# Patient Record
Sex: FEMALE | Race: WHITE | NOT HISPANIC OR LATINO | Employment: PART TIME | URBAN - NONMETROPOLITAN AREA
[De-identification: names, ages, dates, MRNs, and addresses within clinical notes are randomized per-mention and may not be internally consistent; named-entity substitution may affect disease eponyms.]

---

## 2017-05-07 ENCOUNTER — HOSPITAL ENCOUNTER (EMERGENCY)
Facility: HOSPITAL | Age: 27
Discharge: HOME | End: 2017-05-07
Attending: FAMILY MEDICINE | Admitting: FAMILY MEDICINE

## 2017-05-07 ENCOUNTER — APPOINTMENT (OUTPATIENT)
Dept: CT IMAGING | Facility: HOSPITAL | Age: 27
End: 2017-05-07

## 2017-05-07 VITALS
HEIGHT: 66 IN | SYSTOLIC BLOOD PRESSURE: 137 MMHG | DIASTOLIC BLOOD PRESSURE: 74 MMHG | WEIGHT: 226 LBS | HEART RATE: 78 BPM | OXYGEN SATURATION: 99 % | RESPIRATION RATE: 18 BRPM | BODY MASS INDEX: 36.32 KG/M2 | TEMPERATURE: 98 F

## 2017-05-07 DIAGNOSIS — G44.209 TENSION HEADACHE: Primary | ICD-10-CM

## 2017-05-07 LAB
ALBUMIN SERPL-MCNC: 4.2 G/DL (ref 3.5–5)
ALBUMIN/GLOB SERPL: 1.3 G/DL (ref 1.1–2.5)
ALP SERPL-CCNC: 120 U/L (ref 24–120)
ALT SERPL W P-5'-P-CCNC: 43 U/L (ref 0–54)
AMYLASE SERPL-CCNC: 80 U/L (ref 30–110)
ANION GAP SERPL CALCULATED.3IONS-SCNC: 14 MMOL/L (ref 4–13)
AST SERPL-CCNC: 34 U/L (ref 7–45)
BASOPHILS # BLD AUTO: 0.04 10*3/MM3 (ref 0–0.2)
BASOPHILS NFR BLD AUTO: 0.3 % (ref 0–2)
BILIRUB SERPL-MCNC: 0.4 MG/DL (ref 0.1–1)
BUN BLD-MCNC: 15 MG/DL (ref 5–21)
BUN/CREAT SERPL: 17.6 (ref 7–25)
CALCIUM SPEC-SCNC: 9.1 MG/DL (ref 8.4–10.4)
CHLORIDE SERPL-SCNC: 105 MMOL/L (ref 98–110)
CO2 SERPL-SCNC: 21 MMOL/L (ref 24–31)
CREAT BLD-MCNC: 0.85 MG/DL (ref 0.5–1.4)
CRP SERPL-MCNC: 0.56 MG/DL (ref 0–0.99)
DEPRECATED RDW RBC AUTO: 44 FL (ref 40–54)
EOSINOPHIL # BLD AUTO: 0.24 10*3/MM3 (ref 0–0.7)
EOSINOPHIL NFR BLD AUTO: 2 % (ref 0–4)
ERYTHROCYTE [DISTWIDTH] IN BLOOD BY AUTOMATED COUNT: 14.3 % (ref 12–15)
ERYTHROCYTE [SEDIMENTATION RATE] IN BLOOD: 16 MM/HR (ref 0–20)
GFR SERPL CREATININE-BSD FRML MDRD: 80 ML/MIN/1.73
GLOBULIN UR ELPH-MCNC: 3.3 GM/DL
GLUCOSE BLD-MCNC: 98 MG/DL (ref 70–100)
HCT VFR BLD AUTO: 40.3 % (ref 37–47)
HGB BLD-MCNC: 14.2 G/DL (ref 12–16)
IMM GRANULOCYTES # BLD: 0.02 10*3/MM3 (ref 0–0.03)
IMM GRANULOCYTES NFR BLD: 0.2 % (ref 0–5)
LIPASE SERPL-CCNC: 64 U/L (ref 23–203)
LYMPHOCYTES # BLD AUTO: 2.85 10*3/MM3 (ref 0.72–4.86)
LYMPHOCYTES NFR BLD AUTO: 23.3 % (ref 15–45)
MCH RBC QN AUTO: 29.6 PG (ref 28–32)
MCHC RBC AUTO-ENTMCNC: 35.2 G/DL (ref 33–36)
MCV RBC AUTO: 84.1 FL (ref 82–98)
MONOCYTES # BLD AUTO: 0.61 10*3/MM3 (ref 0.19–1.3)
MONOCYTES NFR BLD AUTO: 5 % (ref 4–12)
NEUTROPHILS # BLD AUTO: 8.47 10*3/MM3 (ref 1.87–8.4)
NEUTROPHILS NFR BLD AUTO: 69.2 % (ref 39–78)
PLATELET # BLD AUTO: 378 10*3/MM3 (ref 130–400)
PMV BLD AUTO: 10.2 FL (ref 6–12)
POTASSIUM BLD-SCNC: 4 MMOL/L (ref 3.5–5.3)
PROT SERPL-MCNC: 7.5 G/DL (ref 6.3–8.7)
RBC # BLD AUTO: 4.79 10*6/MM3 (ref 4.2–5.4)
SODIUM BLD-SCNC: 140 MMOL/L (ref 135–145)
TSH SERPL DL<=0.05 MIU/L-ACNC: 1.88 MIU/ML (ref 0.47–4.68)
WBC NRBC COR # BLD: 12.23 10*3/MM3 (ref 4.8–10.8)

## 2017-05-07 PROCEDURE — 80053 COMPREHEN METABOLIC PANEL: CPT | Performed by: FAMILY MEDICINE

## 2017-05-07 PROCEDURE — 99283 EMERGENCY DEPT VISIT LOW MDM: CPT

## 2017-05-07 PROCEDURE — 82150 ASSAY OF AMYLASE: CPT | Performed by: FAMILY MEDICINE

## 2017-05-07 PROCEDURE — 70450 CT HEAD/BRAIN W/O DYE: CPT

## 2017-05-07 PROCEDURE — 96360 HYDRATION IV INFUSION INIT: CPT

## 2017-05-07 PROCEDURE — 84443 ASSAY THYROID STIM HORMONE: CPT | Performed by: FAMILY MEDICINE

## 2017-05-07 PROCEDURE — 86140 C-REACTIVE PROTEIN: CPT | Performed by: FAMILY MEDICINE

## 2017-05-07 PROCEDURE — 85025 COMPLETE CBC W/AUTO DIFF WBC: CPT | Performed by: FAMILY MEDICINE

## 2017-05-07 PROCEDURE — 83690 ASSAY OF LIPASE: CPT | Performed by: FAMILY MEDICINE

## 2017-05-07 PROCEDURE — 85651 RBC SED RATE NONAUTOMATED: CPT | Performed by: FAMILY MEDICINE

## 2017-05-07 RX ORDER — HYDROCODONE BITARTRATE AND ACETAMINOPHEN 5; 325 MG/1; MG/1
1 TABLET ORAL ONCE
Status: COMPLETED | OUTPATIENT
Start: 2017-05-07 | End: 2017-05-07

## 2017-05-07 RX ORDER — PROMETHAZINE HYDROCHLORIDE 25 MG/1
12.5 TABLET ORAL ONCE
Status: DISCONTINUED | OUTPATIENT
Start: 2017-05-07 | End: 2017-05-07

## 2017-05-07 RX ORDER — BUTALBITAL, ACETAMINOPHEN AND CAFFEINE 50; 325; 40 MG/1; MG/1; MG/1
1 TABLET ORAL EVERY 6 HOURS PRN
Qty: 10 TABLET | Refills: 0 | Status: SHIPPED | OUTPATIENT
Start: 2017-05-07 | End: 2019-12-12

## 2017-05-07 RX ORDER — ONDANSETRON 4 MG/1
4 TABLET, ORALLY DISINTEGRATING ORAL 4 TIMES DAILY PRN
Qty: 20 TABLET | Refills: 0 | Status: SHIPPED | OUTPATIENT
Start: 2017-05-07 | End: 2018-06-14

## 2017-05-07 RX ORDER — LAMOTRIGINE 100 MG/1
100 TABLET ORAL DAILY
COMMUNITY

## 2017-05-07 RX ORDER — SODIUM CHLORIDE 9 MG/ML
125 INJECTION, SOLUTION INTRAVENOUS CONTINUOUS
Status: DISCONTINUED | OUTPATIENT
Start: 2017-05-07 | End: 2017-05-08 | Stop reason: HOSPADM

## 2017-05-07 RX ORDER — LURASIDONE HYDROCHLORIDE 40 MG/1
40 TABLET, FILM COATED ORAL DAILY
COMMUNITY

## 2017-05-07 RX ORDER — DIAZEPAM 5 MG/1
5 TABLET ORAL EVERY 12 HOURS PRN
COMMUNITY

## 2017-05-07 RX ADMIN — HYDROCODONE BITARTRATE AND ACETAMINOPHEN 1 TABLET: 5; 325 TABLET ORAL at 23:14

## 2017-05-07 RX ADMIN — SODIUM CHLORIDE 1000 ML: 9 INJECTION, SOLUTION INTRAVENOUS at 22:03

## 2017-05-08 LAB
HOLD SPECIMEN: NORMAL
HOLD SPECIMEN: NORMAL
WHOLE BLOOD HOLD SPECIMEN: NORMAL
WHOLE BLOOD HOLD SPECIMEN: NORMAL

## 2017-07-12 ENCOUNTER — HOSPITAL ENCOUNTER (EMERGENCY)
Facility: HOSPITAL | Age: 27
Discharge: HOME | End: 2017-07-12
Admitting: EMERGENCY MEDICINE

## 2017-07-12 ENCOUNTER — APPOINTMENT (OUTPATIENT)
Dept: GENERAL RADIOLOGY | Facility: HOSPITAL | Age: 27
End: 2017-07-12

## 2017-07-12 VITALS
HEART RATE: 86 BPM | RESPIRATION RATE: 16 BRPM | OXYGEN SATURATION: 100 % | SYSTOLIC BLOOD PRESSURE: 135 MMHG | BODY MASS INDEX: 38.03 KG/M2 | DIASTOLIC BLOOD PRESSURE: 85 MMHG | HEIGHT: 65 IN | TEMPERATURE: 98.1 F | WEIGHT: 228.25 LBS

## 2017-07-12 DIAGNOSIS — S60.221A CONTUSION OF RIGHT HAND, INITIAL ENCOUNTER: Primary | ICD-10-CM

## 2017-07-12 PROCEDURE — 99283 EMERGENCY DEPT VISIT LOW MDM: CPT

## 2017-07-12 PROCEDURE — 73130 X-RAY EXAM OF HAND: CPT

## 2017-07-12 RX ORDER — IBUPROFEN 800 MG/1
800 TABLET ORAL EVERY 6 HOURS PRN
Qty: 21 TABLET | Refills: 0 | Status: SHIPPED | OUTPATIENT
Start: 2017-07-12 | End: 2022-12-28 | Stop reason: SDUPTHER

## 2017-07-12 RX ORDER — IBUPROFEN 800 MG/1
800 TABLET ORAL ONCE
Status: COMPLETED | OUTPATIENT
Start: 2017-07-12 | End: 2017-07-12

## 2017-07-12 RX ADMIN — IBUPROFEN 800 MG: 800 TABLET, FILM COATED ORAL at 18:02

## 2017-07-12 NOTE — ED PROVIDER NOTES
Subjective   History of Present Illness  19-year-old female presents with chief complaint of right hand pain.  She reports past few weeks she has numbness right hand pain that is worse with palpation and with usage.  It is on the dorsum side of her hand.  Patient believes she may have injured it in multiple ways and is unsure of how she may have truthfully injured.  Review of Systems   All other systems reviewed and are negative.      Past Medical History:   Diagnosis Date   • Anxiety    • Bipolar 1 disorder    • Depression        No Known Allergies    Past Surgical History:   Procedure Laterality Date   • TUBAL ABDOMINAL LIGATION         History reviewed. No pertinent family history.    Social History     Social History   • Marital status: Single     Spouse name: N/A   • Number of children: N/A   • Years of education: N/A     Social History Main Topics   • Smoking status: Current Every Day Smoker     Packs/day: 0.50     Types: Cigarettes   • Smokeless tobacco: None   • Alcohol use No   • Drug use: No   • Sexual activity: Yes     Partners: Male     Other Topics Concern   • None     Social History Narrative   • None           Objective   Physical Exam   Constitutional: She is oriented to person, place, and time. She appears well-developed and well-nourished.   HENT:   Head: Normocephalic and atraumatic.   Eyes: EOM are normal. Pupils are equal, round, and reactive to light.   Neck: Normal range of motion. Neck supple.   Cardiovascular: Normal rate and regular rhythm.    Pulmonary/Chest: Effort normal and breath sounds normal.   Abdominal: Soft.   Musculoskeletal: Normal range of motion.   Patient is able to flex and extend all digits of her hand.  There is no evidence of deformity or swelling.   Neurological: She is alert and oriented to person, place, and time.   Skin: Skin is warm.   Nursing note and vitals reviewed.      Procedures         ED Course  ED Course                  MDM  Number of Diagnoses or  Management Options  Diagnosis management comments: Unremarkable hand film.  Probable contusion.  She can take nonsteroidal anti-inflammatory drugs assist with the pain and inflammation next    Hand xray interpreted by me       Amount and/or Complexity of Data Reviewed  Tests in the radiology section of CPT®: ordered and reviewed    Risk of Complications, Morbidity, and/or Mortality  Presenting problems: low  Diagnostic procedures: low  Management options: low    Patient Progress  Patient progress: stable      Final diagnoses:   Contusion of right hand, initial encounter            Kendrick Khoury PA-C  07/13/17 1134

## 2018-03-12 ENCOUNTER — HOSPITAL ENCOUNTER (EMERGENCY)
Facility: HOSPITAL | Age: 28
Discharge: HOME | End: 2018-03-12
Attending: FAMILY MEDICINE | Admitting: FAMILY MEDICINE

## 2018-03-12 ENCOUNTER — APPOINTMENT (OUTPATIENT)
Dept: CT IMAGING | Facility: HOSPITAL | Age: 28
End: 2018-03-12

## 2018-03-12 VITALS
OXYGEN SATURATION: 98 % | TEMPERATURE: 97.7 F | HEIGHT: 65 IN | BODY MASS INDEX: 36.65 KG/M2 | SYSTOLIC BLOOD PRESSURE: 110 MMHG | DIASTOLIC BLOOD PRESSURE: 70 MMHG | RESPIRATION RATE: 16 BRPM | HEART RATE: 76 BPM | WEIGHT: 220 LBS

## 2018-03-12 DIAGNOSIS — R10.84 GENERALIZED ABDOMINAL PAIN: Primary | ICD-10-CM

## 2018-03-12 DIAGNOSIS — N39.0 ACUTE LOWER UTI: ICD-10-CM

## 2018-03-12 LAB
ALBUMIN SERPL-MCNC: 4.6 G/DL (ref 3.5–5)
ALBUMIN/GLOB SERPL: 1 G/DL (ref 1.1–2.5)
ALP SERPL-CCNC: 135 U/L (ref 24–120)
ALT SERPL W P-5'-P-CCNC: 59 U/L (ref 0–54)
ANION GAP SERPL CALCULATED.3IONS-SCNC: 15 MMOL/L (ref 4–13)
AST SERPL-CCNC: 58 U/L (ref 7–45)
B-HCG UR QL: NEGATIVE
BACTERIA UR QL AUTO: ABNORMAL /HPF
BASOPHILS # BLD AUTO: 0.06 10*3/MM3 (ref 0–0.2)
BASOPHILS NFR BLD AUTO: 0.5 % (ref 0–2)
BILIRUB SERPL-MCNC: 0.7 MG/DL (ref 0.1–1)
BILIRUB UR QL STRIP: ABNORMAL
BUN BLD-MCNC: 16 MG/DL (ref 5–21)
BUN/CREAT SERPL: 24.2 (ref 7–25)
CALCIUM SPEC-SCNC: 9.6 MG/DL (ref 8.4–10.4)
CHLORIDE SERPL-SCNC: 102 MMOL/L (ref 98–110)
CLARITY UR: ABNORMAL
CO2 SERPL-SCNC: 24 MMOL/L (ref 24–31)
COLOR UR: ABNORMAL
CREAT BLD-MCNC: 0.66 MG/DL (ref 0.5–1.4)
DEPRECATED RDW RBC AUTO: 43.2 FL (ref 40–54)
EOSINOPHIL # BLD AUTO: 0.52 10*3/MM3 (ref 0–0.7)
EOSINOPHIL NFR BLD AUTO: 4 % (ref 0–4)
ERYTHROCYTE [DISTWIDTH] IN BLOOD BY AUTOMATED COUNT: 13.9 % (ref 12–15)
GFR SERPL CREATININE-BSD FRML MDRD: 107 ML/MIN/1.73
GLOBULIN UR ELPH-MCNC: 4.5 GM/DL
GLUCOSE BLD-MCNC: 152 MG/DL (ref 70–100)
GLUCOSE UR STRIP-MCNC: NEGATIVE MG/DL
HCT VFR BLD AUTO: 41.4 % (ref 37–47)
HGB BLD-MCNC: 13.9 G/DL (ref 12–16)
HGB UR QL STRIP.AUTO: NEGATIVE
HYALINE CASTS UR QL AUTO: ABNORMAL /LPF
IMM GRANULOCYTES # BLD: 0.08 10*3/MM3 (ref 0–0.03)
IMM GRANULOCYTES NFR BLD: 0.6 % (ref 0–5)
INTERNAL NEGATIVE CONTROL: NEGATIVE
INTERNAL POSITIVE CONTROL: POSITIVE
KETONES UR QL STRIP: ABNORMAL
LEUKOCYTE ESTERASE UR QL STRIP.AUTO: ABNORMAL
LIPASE SERPL-CCNC: 55 U/L (ref 23–203)
LYMPHOCYTES # BLD AUTO: 2.42 10*3/MM3 (ref 0.72–4.86)
LYMPHOCYTES NFR BLD AUTO: 18.6 % (ref 15–45)
Lab: NORMAL
MCH RBC QN AUTO: 29 PG (ref 28–32)
MCHC RBC AUTO-ENTMCNC: 33.6 G/DL (ref 33–36)
MCV RBC AUTO: 86.4 FL (ref 82–98)
MONOCYTES # BLD AUTO: 0.62 10*3/MM3 (ref 0.19–1.3)
MONOCYTES NFR BLD AUTO: 4.8 % (ref 4–12)
NEUTROPHILS # BLD AUTO: 9.29 10*3/MM3 (ref 1.87–8.4)
NEUTROPHILS NFR BLD AUTO: 71.5 % (ref 39–78)
NITRITE UR QL STRIP: NEGATIVE
NRBC BLD MANUAL-RTO: 0 /100 WBC (ref 0–0)
PH UR STRIP.AUTO: 6 [PH] (ref 5–8)
PLATELET # BLD AUTO: 426 10*3/MM3 (ref 130–400)
PMV BLD AUTO: 9.6 FL (ref 6–12)
POTASSIUM BLD-SCNC: 4.5 MMOL/L (ref 3.5–5.3)
PROT SERPL-MCNC: 9.1 G/DL (ref 6.3–8.7)
PROT UR QL STRIP: ABNORMAL
RBC # BLD AUTO: 4.79 10*6/MM3 (ref 4.2–5.4)
RBC # UR: ABNORMAL /HPF
REF LAB TEST METHOD: ABNORMAL
SODIUM BLD-SCNC: 141 MMOL/L (ref 135–145)
SP GR UR STRIP: >1.03 (ref 1–1.03)
SQUAMOUS #/AREA URNS HPF: ABNORMAL /HPF
UROBILINOGEN UR QL STRIP: ABNORMAL
WBC NRBC COR # BLD: 12.99 10*3/MM3 (ref 4.8–10.8)
WBC UR QL AUTO: ABNORMAL /HPF

## 2018-03-12 PROCEDURE — 81001 URINALYSIS AUTO W/SCOPE: CPT | Performed by: FAMILY MEDICINE

## 2018-03-12 PROCEDURE — 74176 CT ABD & PELVIS W/O CONTRAST: CPT

## 2018-03-12 PROCEDURE — 25010000002 KETOROLAC TROMETHAMINE PER 15 MG: Performed by: FAMILY MEDICINE

## 2018-03-12 PROCEDURE — 99284 EMERGENCY DEPT VISIT MOD MDM: CPT

## 2018-03-12 PROCEDURE — 80053 COMPREHEN METABOLIC PANEL: CPT | Performed by: FAMILY MEDICINE

## 2018-03-12 PROCEDURE — 25010000002 ONDANSETRON PER 1 MG: Performed by: FAMILY MEDICINE

## 2018-03-12 PROCEDURE — 85025 COMPLETE CBC W/AUTO DIFF WBC: CPT | Performed by: FAMILY MEDICINE

## 2018-03-12 PROCEDURE — 83690 ASSAY OF LIPASE: CPT | Performed by: FAMILY MEDICINE

## 2018-03-12 PROCEDURE — 96375 TX/PRO/DX INJ NEW DRUG ADDON: CPT

## 2018-03-12 PROCEDURE — 96374 THER/PROPH/DIAG INJ IV PUSH: CPT

## 2018-03-12 PROCEDURE — 96361 HYDRATE IV INFUSION ADD-ON: CPT

## 2018-03-12 RX ORDER — ONDANSETRON 4 MG/1
4 TABLET, ORALLY DISINTEGRATING ORAL EVERY 4 HOURS PRN
Qty: 10 TABLET | Refills: 0 | Status: SHIPPED | OUTPATIENT
Start: 2018-03-12 | End: 2018-06-14

## 2018-03-12 RX ORDER — KETOROLAC TROMETHAMINE 30 MG/ML
30 INJECTION, SOLUTION INTRAMUSCULAR; INTRAVENOUS ONCE
Status: COMPLETED | OUTPATIENT
Start: 2018-03-12 | End: 2018-03-12

## 2018-03-12 RX ORDER — CIPROFLOXACIN 500 MG/1
500 TABLET, FILM COATED ORAL 2 TIMES DAILY
Qty: 20 TABLET | Refills: 0 | Status: SHIPPED | OUTPATIENT
Start: 2018-03-12 | End: 2018-06-14

## 2018-03-12 RX ORDER — SODIUM CHLORIDE, SODIUM LACTATE, POTASSIUM CHLORIDE, CALCIUM CHLORIDE 600; 310; 30; 20 MG/100ML; MG/100ML; MG/100ML; MG/100ML
125 INJECTION, SOLUTION INTRAVENOUS CONTINUOUS
Status: DISCONTINUED | OUTPATIENT
Start: 2018-03-12 | End: 2018-03-12 | Stop reason: HOSPADM

## 2018-03-12 RX ORDER — ONDANSETRON 2 MG/ML
4 INJECTION INTRAMUSCULAR; INTRAVENOUS ONCE
Status: COMPLETED | OUTPATIENT
Start: 2018-03-12 | End: 2018-03-12

## 2018-03-12 RX ORDER — HYDROCODONE BITARTRATE AND ACETAMINOPHEN 7.5; 325 MG/1; MG/1
1 TABLET ORAL EVERY 4 HOURS PRN
Qty: 15 TABLET | Refills: 0 | Status: SHIPPED | OUTPATIENT
Start: 2018-03-12 | End: 2018-06-14

## 2018-03-12 RX ADMIN — KETOROLAC TROMETHAMINE 30 MG: 30 INJECTION, SOLUTION INTRAMUSCULAR at 06:06

## 2018-03-12 RX ADMIN — ONDANSETRON 4 MG: 2 INJECTION INTRAMUSCULAR; INTRAVENOUS at 06:06

## 2018-03-12 RX ADMIN — SODIUM CHLORIDE, POTASSIUM CHLORIDE, SODIUM LACTATE AND CALCIUM CHLORIDE 125 ML/HR: 600; 310; 30; 20 INJECTION, SOLUTION INTRAVENOUS at 06:05

## 2018-03-12 NOTE — ED PROVIDER NOTES
[facility]  eMERGENCY dEPARTMENT eNCOUnter      Pt Name: Elvi Haley  MRN: [id number]  Birthdate 1990  Date of evaluation: 3/12/2018      CHIEF COMPLAINT       Chief Complaint   Patient presents with   • Abdominal Pain   • Nausea   • Vomiting       Nurses Notes reviewed and I agree except as noted in the HPI.      HISTORY OF PRESENT ILLNESS    Elvi Haley is a 27 y.o. female who presents     Patient presents for generalized abdominal pain that started yesterday at 8:30 AM.  She describes the pain as cramping diffuse nonradiating.  Patient states that it is sharp in nature and cramping.  She has had nothing prior to arrival for treatment.  She presents here for evaluation and treatment.     REVIEW OF SYSTEMS     Review of Systems  CONSTITUION: No Fever, No chills, No activity change, No diaphoresis, No unexpected wt change.    HENT: No congestion, no dental problems, no ear pain or discharge,   EYES: No drainage, no itching, no photophobia, no visual disturbance  RESPIRATORY: No apnea, no chest tightness, no cough, no shortness of breath, no stridor, no wheezing  CARDIOVASCULAR: No chest pain, no palpitations, no leg swelling  GI: Procedures  As per the HPI otherwise negative.  ENDOCRINE: No polydipsia, no polyphagia, no polyuria, no cold or heat intolerance  : No difficulty urinating, no dyspareunia, nodysuria, no flank pain, no frequency, no genital sore, no hematuria, no menstrual problem if female, no decreased urniation, no hesitation of urination, no vaginal discharge if female, no vaginal pain if female no penile pain if male  ALLERG/IMMUNO:  No env or food allergies, not immunocompromised  NEUROLOGICAL: No dizziness, no facial asymmetry, no Headaches, no Light-headedness, no numbess nor paresthesias, no seizures, no speech difficulty, No syncope, no temors, no weakness  HEMATOLOGIC: No adenopathy, no unusual bleeding or bruising  MUSC: No arthralgia, no back pain, no joint  "swelling, no myalgias, no neck pain, no neck stiffness  SKIN: No rash, no color change, no pallor, no wound  PSYCH: No agitation, no behavior problem, no confusion, no decr concentration, no hallucinations, no suicidal ideation, no homicidal ideation, no self injury, no sleep disturbance      PAST MEDICAL HISTORY     Past Medical History:   Diagnosis Date   • Anxiety    • Bipolar 1 disorder    • Depression        SURGICAL HISTORY      has a past surgical history that includes Tubal ligation.    CURRENT MEDICATIONS        Medication List      ASK your doctor about these medications    butalbital-acetaminophen-caffeine -40 MG per tablet  Commonly known as:  FIORICET, ESGIC  Take 1 tablet by mouth Every 6 (Six) Hours As Needed for Headache for up   to 10 doses.     diazePAM 5 MG tablet  Commonly known as:  VALIUM     ibuprofen 800 MG tablet  Commonly known as:  ADVIL,MOTRIN  Take 1 tablet by mouth Every 6 (Six) Hours As Needed for Mild Pain (1-3).     lamoTRIgine 100 MG tablet  Commonly known as:  LaMICtal     lurasidone 40 MG tablet tablet  Commonly known as:  LATUDA     ondansetron ODT 4 MG disintegrating tablet  Commonly known as:  ZOFRAN-ODT  Take 1 tablet by mouth 4 (Four) Times a Day As Needed for Nausea or   Vomiting.     prenatal (CLASSIC) vitamin 28-0.8 MG tablet tablet          ALLERGIES     has No Known Allergies.    FAMILY HISTORY     has no family status information on file.    family history is not on file.    SOCIAL HISTORY      reports that she has been smoking Cigarettes.  She has been smoking about 0.50 packs per day. She does not have any smokeless tobacco history on file. She reports that she does not drink alcohol or use drugs.    PHYSICAL EXAM     INITIAL VITALS:  height is 165.1 cm (65\") and weight is 99.8 kg (220 lb). Her temperature is 98.1 °F (36.7 °C). Her blood pressure is 134/87 and her pulse is 94. Her respiration is 16 and oxygen saturation is 99%.    Physical " Exam    CONSTITUTIONAL: Well developed, well nourished, not diaphoretic nor distressed  HENT: Normocephalic, atraumatic, oropharynx clear and moist  EYES: PERRL, EOM normal, no discharge, no scleral icterus  NECK: ROM normal, supple, no tracheal deviation nor JVD, no stridor  CARDIOVASCULAR: Normal rate and rhythm, heart sounds normal, no rub no gallop, intact distal pulses, normal cap refill  PULMONARY: Normal effort and breath sounds, no distress, no wheezes, rhonci or rales, no chest tenderness  ABDOMINAL: Soft, nontender, no guarding, no mass, no rebound, no hernia  GENITOURINARY/ANORECTAL: deferred  MUSCKULOSKELETAL: ROM normal, no tenderness nor deformity, no edema  NEUROLOGICAL: Alert, oriented x 3, DTRS normal, CN x 10 normal, normal tone  SKIN: Warm, dry, no erythema, no rash, normal color  PSYCH: Mood and affect normal, behavior normal, thought content and judgement normal.    DIFFERENTIAL DIAGNOSIS:       DIAGNOSTIC RESULTS     EKG: All EKG's are interpreted by the Emergency Department Physician who either signs or Co-signs this chart in the absence of a cardiologist.      RADIOLOGY: non-plain film images(s) such as CT, Ultrasound and MRI are read by the radiologist.  Plain radiographic images are visualized and preliminarily interpreted by the emergency physician unless otherwise stated below.  No results found.           LABS:   Lab Results (last 24 hours)     Procedure Component Value Units Date/Time    CBC & Differential [[id number]] Collected:  03/12/18 0544    Specimen:  Blood Updated:  03/12/18 0556    Narrative:       The following orders were created for panel order CBC & Differential.  Procedure                               Abnormality         Status                     ---------                               -----------         ------                     CBC Auto Differential[[id number]]         Abnormal            Final result                 Please view results for these tests on the individual  "orders.    Comprehensive Metabolic Panel [[id number]] Collected:  03/12/18 0544    Specimen:  Blood Updated:  03/12/18 0548    Lipase [[id number]] Collected:  03/12/18 0544    Specimen:  Blood Updated:  03/12/18 0548    CBC Auto Differential [[id number]]  (Abnormal) Collected:  03/12/18 0544    Specimen:  Blood Updated:  03/12/18 0556     WBC 12.99 (H) 10*3/mm3      RBC 4.79 10*6/mm3      Hemoglobin 13.9 g/dL      Hematocrit 41.4 %      MCV 86.4 fL      MCH 29.0 pg      MCHC 33.6 g/dL      RDW 13.9 %      RDW-SD 43.2 fl      MPV 9.6 fL      Platelets 426 (H) 10*3/mm3      Neutrophil % 71.5 %      Lymphocyte % 18.6 %      Monocyte % 4.8 %      Eosinophil % 4.0 %      Basophil % 0.5 %      Immature Grans % 0.6 %      Neutrophils, Absolute 9.29 (H) 10*3/mm3      Lymphocytes, Absolute 2.42 10*3/mm3      Monocytes, Absolute 0.62 10*3/mm3      Eosinophils, Absolute 0.52 10*3/mm3      Basophils, Absolute 0.06 10*3/mm3      Immature Grans, Absolute 0.08 (H) 10*3/mm3      nRBC 0.0 /100 WBC           EMERGENCY DEPARTMENT COURSE:   Vitals:    Vitals:    03/12/18 0506 03/12/18 0513 03/12/18 0516   BP: 141/96  134/87   Pulse: 94 94    Resp: 16     Temp: 98.1 °F (36.7 °C)     SpO2: 100% 99%    Weight: 99.8 kg (220 lb)     Height: 165.1 cm (65\")         The patient was given the following medications:  Medications   ondansetron (ZOFRAN) injection 4 mg (not administered)   ketorolac (TORADOL) injection 30 mg (not administered)   lactated ringers infusion (not administered)       ED Course       CRITICAL CARE:  none    CONSULTS:  none    PROCEDURES:  None    FINAL IMPRESSION      1. Generalized abdominal pain          DISPOSITION/PLAN   Patient will be transferred to the care of Dr. Lisa at shift change she is currently stable.      PATIENT REFERRED TO:  No follow-up provider specified.    DISCHARGE MEDICATIONS:     Medication List      ASK your doctor about these medications    butalbital-acetaminophen-caffeine -40 MG per " tablet  Commonly known as:  FIORICET, ESGIC  Take 1 tablet by mouth Every 6 (Six) Hours As Needed for Headache for up   to 10 doses.     diazePAM 5 MG tablet  Commonly known as:  VALIUM     ibuprofen 800 MG tablet  Commonly known as:  ADVIL,MOTRIN  Take 1 tablet by mouth Every 6 (Six) Hours As Needed for Mild Pain (1-3).     lamoTRIgine 100 MG tablet  Commonly known as:  LaMICtal     lurasidone 40 MG tablet tablet  Commonly known as:  LATUDA     ondansetron ODT 4 MG disintegrating tablet  Commonly known as:  ZOFRAN-ODT  Take 1 tablet by mouth 4 (Four) Times a Day As Needed for Nausea or   Vomiting.     prenatal (CLASSIC) vitamin 28-0.8 MG tablet tablet          (Please note that portions of this note were completed with a voice recognition program.)    Eleanor Timmons, DO Eleanor Timmons DO  03/12/18 0601

## 2018-03-15 NOTE — ED NOTES
"ED Call Back Questions    1. How are you doing since leaving the Emergency Department?    Feeling a lot better  2. Do you have any questions about your discharge instructions? No     3. Have you filled your new prescriptions yet? Yes   a. Do you have any questions about those medications? No     4. Were you able to make a follow-up appointment with the physician? No     5. Do you have a primary care physician? No   a. If No, would you like for me to set you up with one? No   i. If Yes, “I will have our ED  give you a call right back at this number to work with you on the best time for an appointment.”    6. We are always looking to get better at what we do. Do you have any suggestions for what we can do to be even better? N/A  a. If Yes, \"Thank you for sharing your concerns. I apologize. I will follow up with our manager and patient . Would you like someone to call you back?\" N/A    7. Is there anything else I can do for you? No   Visit was good     Maximino Arroyo  03/15/18 0934    "

## 2018-03-22 ENCOUNTER — TELEPHONE (OUTPATIENT)
Dept: GASTROENTEROLOGY | Age: 28
End: 2018-03-22

## 2018-06-14 ENCOUNTER — HOSPITAL ENCOUNTER (EMERGENCY)
Facility: HOSPITAL | Age: 28
Discharge: HOME | End: 2018-06-14
Admitting: EMERGENCY MEDICINE

## 2018-06-14 VITALS
HEART RATE: 138 BPM | WEIGHT: 216 LBS | OXYGEN SATURATION: 97 % | RESPIRATION RATE: 18 BRPM | DIASTOLIC BLOOD PRESSURE: 102 MMHG | BODY MASS INDEX: 35.99 KG/M2 | HEIGHT: 65 IN | SYSTOLIC BLOOD PRESSURE: 141 MMHG | TEMPERATURE: 98.5 F

## 2018-06-14 DIAGNOSIS — K08.89 PAIN, DENTAL: Primary | ICD-10-CM

## 2018-06-14 PROCEDURE — 99282 EMERGENCY DEPT VISIT SF MDM: CPT

## 2018-06-14 RX ORDER — ONDANSETRON 4 MG/1
4 TABLET, ORALLY DISINTEGRATING ORAL EVERY 6 HOURS PRN
Qty: 12 TABLET | Refills: 0 | Status: SHIPPED | OUTPATIENT
Start: 2018-06-14 | End: 2019-12-12 | Stop reason: SDUPTHER

## 2018-06-14 RX ORDER — HYDROCODONE BITARTRATE AND ACETAMINOPHEN 5; 325 MG/1; MG/1
1 TABLET ORAL EVERY 4 HOURS PRN
Qty: 12 TABLET | Refills: 0 | OUTPATIENT
Start: 2018-06-14 | End: 2022-12-28

## 2018-06-14 RX ORDER — PENICILLIN V POTASSIUM 500 MG/1
500 TABLET ORAL 4 TIMES DAILY
Qty: 40 TABLET | Refills: 0 | Status: SHIPPED | OUTPATIENT
Start: 2018-06-14 | End: 2018-06-24

## 2018-06-15 NOTE — ED PROVIDER NOTES
Subjective     Dental Pain   Location:  Lower  Lower teeth location:  18/LL 2nd molar  Quality:  Throbbing  Severity:  Moderate  Timing:  Intermittent  Chronicity:  New  Context: dental fracture    Relieved by:  Nothing  Ineffective treatments:  Topical anesthetic gel  Associated symptoms: facial pain    Associated symptoms: no congestion, no difficulty swallowing, no drooling, no facial swelling, no fever, no gum swelling, no neck pain, no neck swelling, no oral bleeding, no oral lesions and no trismus    Risk factors: lack of dental care and smoking        Review of Systems   Constitutional: Negative for appetite change, chills and fever.   HENT: Positive for dental problem. Negative for congestion, drooling, facial swelling, mouth sores and sore throat.    Gastrointestinal: Positive for vomiting. Negative for abdominal pain.   Musculoskeletal: Negative for back pain, joint swelling, neck pain and neck stiffness.   Skin: Negative for rash.   All other systems reviewed and are negative.      Past Medical History:   Diagnosis Date   • Anxiety    • Bipolar 1 disorder    • Depression        No Known Allergies    Past Surgical History:   Procedure Laterality Date   • TUBAL ABDOMINAL LIGATION         History reviewed. No pertinent family history.    Social History     Social History   • Marital status: Single     Social History Main Topics   • Smoking status: Current Every Day Smoker     Packs/day: 0.50     Types: Cigarettes   • Alcohol use No   • Drug use: No   • Sexual activity: Yes     Partners: Male     Other Topics Concern   • Not on file           Objective   Physical Exam   Constitutional: She is oriented to person, place, and time. She appears well-developed and well-nourished.   HENT:   Head: Normocephalic and atraumatic.   Right Ear: External ear normal.   Left Ear: External ear normal.   Nose: Nose normal.   Mouth/Throat: Oropharynx is clear and moist.   Dental fracture noted to the left lower 2nd molar  without obvious abscess or facial swelling noted; no trismus noted; no neck swelling or drooling noted   Eyes: Conjunctivae and EOM are normal. Pupils are equal, round, and reactive to light.   Neck: Normal range of motion. Neck supple.   Cardiovascular: Normal rate, regular rhythm, normal heart sounds and intact distal pulses.    Pulmonary/Chest: Effort normal and breath sounds normal.   Abdominal: Soft. Bowel sounds are normal.   Musculoskeletal: Normal range of motion.   Neurological: She is alert and oriented to person, place, and time.   Skin: Skin is warm and dry.   Psychiatric: She has a normal mood and affect. Her behavior is normal. Judgment and thought content normal.   Nursing note and vitals reviewed.      Procedures           ED Course                  MDM  Number of Diagnoses or Management Options  Pain, dental: new and does not require workup     Amount and/or Complexity of Data Reviewed  Discuss the patient with other providers: yes    Risk of Complications, Morbidity, and/or Mortality  Presenting problems: low  Diagnostic procedures: low  Management options: low    Patient Progress  Patient progress: improved        Final diagnoses:   Pain, dental            Marie Escobar, RAZIA  06/14/18 2508

## 2018-10-11 ENCOUNTER — HOSPITAL ENCOUNTER (EMERGENCY)
Facility: HOSPITAL | Age: 28
Discharge: HOME | End: 2018-10-11
Admitting: EMERGENCY MEDICINE

## 2018-10-11 VITALS
RESPIRATION RATE: 18 BRPM | WEIGHT: 208 LBS | BODY MASS INDEX: 34.66 KG/M2 | HEART RATE: 89 BPM | SYSTOLIC BLOOD PRESSURE: 138 MMHG | HEIGHT: 65 IN | DIASTOLIC BLOOD PRESSURE: 84 MMHG | OXYGEN SATURATION: 100 % | TEMPERATURE: 98.3 F

## 2018-10-11 DIAGNOSIS — I88.9 LYMPHADENITIS: Primary | ICD-10-CM

## 2018-10-11 LAB
BASOPHILS # BLD AUTO: 0.04 10*3/MM3 (ref 0–0.2)
BASOPHILS NFR BLD AUTO: 0.4 % (ref 0–2)
DEPRECATED RDW RBC AUTO: 42.9 FL (ref 40–54)
EOSINOPHIL # BLD AUTO: 0.14 10*3/MM3 (ref 0–0.7)
EOSINOPHIL NFR BLD AUTO: 1.5 % (ref 0–4)
ERYTHROCYTE [DISTWIDTH] IN BLOOD BY AUTOMATED COUNT: 13.9 % (ref 12–15)
HCT VFR BLD AUTO: 40.4 % (ref 37–47)
HGB BLD-MCNC: 13.8 G/DL (ref 12–16)
IMM GRANULOCYTES # BLD: 0.02 10*3/MM3 (ref 0–0.03)
IMM GRANULOCYTES NFR BLD: 0.2 % (ref 0–5)
LYMPHOCYTES # BLD AUTO: 3.49 10*3/MM3 (ref 0.72–4.86)
LYMPHOCYTES NFR BLD AUTO: 38 % (ref 15–45)
MCH RBC QN AUTO: 29.4 PG (ref 28–32)
MCHC RBC AUTO-ENTMCNC: 34.2 G/DL (ref 33–36)
MCV RBC AUTO: 86.1 FL (ref 82–98)
MONOCYTES # BLD AUTO: 0.46 10*3/MM3 (ref 0.19–1.3)
MONOCYTES NFR BLD AUTO: 5 % (ref 4–12)
NEUTROPHILS # BLD AUTO: 5.03 10*3/MM3 (ref 1.87–8.4)
NEUTROPHILS NFR BLD AUTO: 54.9 % (ref 39–78)
NRBC BLD MANUAL-RTO: 0 /100 WBC (ref 0–0)
PLATELET # BLD AUTO: 418 10*3/MM3 (ref 130–400)
PMV BLD AUTO: 10.1 FL (ref 6–12)
RBC # BLD AUTO: 4.69 10*6/MM3 (ref 4.2–5.4)
S PYO AG THROAT QL: NEGATIVE
WBC NRBC COR # BLD: 9.18 10*3/MM3 (ref 4.8–10.8)

## 2018-10-11 PROCEDURE — 99283 EMERGENCY DEPT VISIT LOW MDM: CPT

## 2018-10-11 PROCEDURE — 87880 STREP A ASSAY W/OPTIC: CPT | Performed by: NURSE PRACTITIONER

## 2018-10-11 PROCEDURE — 85025 COMPLETE CBC W/AUTO DIFF WBC: CPT | Performed by: NURSE PRACTITIONER

## 2018-10-11 PROCEDURE — 87081 CULTURE SCREEN ONLY: CPT | Performed by: NURSE PRACTITIONER

## 2018-10-11 RX ORDER — AMOXICILLIN AND CLAVULANATE POTASSIUM 875; 125 MG/1; MG/1
1 TABLET, FILM COATED ORAL 2 TIMES DAILY
Qty: 14 TABLET | Refills: 0 | Status: SHIPPED | OUTPATIENT
Start: 2018-10-11 | End: 2018-10-18

## 2018-10-12 NOTE — ED PROVIDER NOTES
Subjective     History provided by:  Patient   used: No    Illness   Location:  Enlarged lymph node to rt neck x 1 day  Quality:  No other associated symptoms  Severity:  Mild  Onset quality:  Sudden  Duration:  1 day  Timing:  Constant  Progression:  Unchanged  Chronicity:  New  Context:  Enlarged painful lymph node to rt neck x 1 day  Relieved by:  None  Worsened by:  None  Ineffective treatments:  None  Associated symptoms: no abdominal pain, no chest pain, no congestion, no cough, no diarrhea, no ear pain, no fatigue, no fever, no headaches, no loss of consciousness, no myalgias, no nausea, no rash, no rhinorrhea, no shortness of breath, no sore throat, no vomiting and no wheezing        Review of Systems   Constitutional: Negative for fatigue and fever.   HENT: Negative for congestion, ear pain, rhinorrhea and sore throat.    Respiratory: Negative for cough, shortness of breath and wheezing.    Cardiovascular: Negative for chest pain.   Gastrointestinal: Negative for abdominal pain, diarrhea, nausea and vomiting.   Musculoskeletal: Negative for myalgias.   Skin: Negative for rash.   Neurological: Negative for loss of consciousness and headaches.   All other systems reviewed and are negative.      Past Medical History:   Diagnosis Date   • Anxiety    • Bipolar 1 disorder (CMS/HCC)    • Depression        No Known Allergies    Past Surgical History:   Procedure Laterality Date   • TUBAL ABDOMINAL LIGATION         History reviewed. No pertinent family history.    Social History     Social History   • Marital status: Single     Social History Main Topics   • Smoking status: Current Every Day Smoker     Packs/day: 0.50     Types: Cigarettes   • Alcohol use No   • Drug use: No   • Sexual activity: Yes     Partners: Male     Other Topics Concern   • Not on file           Objective   Physical Exam   Constitutional: She is oriented to person, place, and time. She appears well-developed and  well-nourished.   HENT:   Head: Normocephalic and atraumatic.   Right Ear: Hearing, tympanic membrane, external ear and ear canal normal.   Left Ear: Hearing, tympanic membrane, external ear and ear canal normal.   Nose: Nose normal. Right sinus exhibits no maxillary sinus tenderness and no frontal sinus tenderness. Left sinus exhibits no maxillary sinus tenderness and no frontal sinus tenderness.   Mouth/Throat: Oropharynx is clear and moist and mucous membranes are normal. Tonsils are 0 on the right. Tonsils are 0 on the left. No tonsillar exudate.   Eyes: Pupils are equal, round, and reactive to light. Conjunctivae are normal.   Neck: Normal range of motion. Neck supple.       Cardiovascular: Normal rate, regular rhythm and normal heart sounds.    Pulmonary/Chest: Effort normal and breath sounds normal.   Lymphadenopathy:     She has cervical adenopathy.   Neurological: She is alert and oriented to person, place, and time.   Skin: Skin is warm and dry. Capillary refill takes less than 2 seconds.   Psychiatric: She has a normal mood and affect.   Nursing note and vitals reviewed.      Procedures           ED Course  ED Course as of Oct 11 2156   Thu Oct 11, 2018   2153 CBC shows no acute findings; WBC normal, H/H stable, platelet count 418. RST negative. Pt will be DC home at this time in stable cond; will give short course of Augmentin; advised to f/u with PCP in 1-2 days for a recheck. Return to the ER as needed.   [LF]      ED Course User Index  [LF] Afua Dover, APRN        No orders to display     Labs Reviewed   CBC WITH AUTO DIFFERENTIAL - Abnormal; Notable for the following:        Result Value    Platelets 418 (*)     All other components within normal limits   RAPID STREP A SCREEN - Normal   BETA HEMOLYTIC STREP CULTURE, THROAT   CBC AND DIFFERENTIAL    Narrative:     The following orders were created for panel order CBC & Differential.  Procedure                               Abnormality          Status                     ---------                               -----------         ------                     CBC Auto Differential[[id number]]         Abnormal            Final result                 Please view results for these tests on the individual orders.               MDM  Number of Diagnoses or Management Options  Lymphadenitis: new and requires workup     Amount and/or Complexity of Data Reviewed  Clinical lab tests: ordered and reviewed    Patient Progress  Patient progress: stable        Final diagnoses:   Lymphadenitis            Afua Dover, APRN  10/11/18 1588

## 2018-10-12 NOTE — DISCHARGE INSTRUCTIONS
Please follow up with your PCP in 1-2 days for a recheck  Return to the ER as needed        Lymphadenopathy  Lymphadenopathy refers to swollen or enlarged lymph glands, also called lymph nodes. Lymph glands are part of your body's defense (immune) system, which protects the body from infections, germs, and diseases. Lymph glands are found in many locations in your body, including the neck, underarm, and groin.  Many things can cause lymph glands to become enlarged. When your immune system responds to germs, such as viruses or bacteria, infection-fighting cells and fluid build up. This causes the glands to grow in size. Usually, this is not something to worry about. The swelling and any soreness often go away without treatment. However, swollen lymph glands can also be caused by a number of diseases. Your health care provider may do various tests to help determine the cause. If the cause of your swollen lymph glands cannot be found, it is important to monitor your condition to make sure the swelling goes away.  Follow these instructions at home:  Watch your condition for any changes. The following actions may help to lessen any discomfort you are feeling:  · Get plenty of rest.  · Take medicines only as directed by your health care provider. Your health care provider may recommend over-the-counter medicines for pain.  · Apply moist heat compresses to the site of swollen lymph nodes as directed by your health care provider. This can help reduce any pain.  · Check your lymph nodes daily for any changes.  · Keep all follow-up visits as directed by your health care provider. This is important.    Contact a health care provider if:  · Your lymph nodes are still swollen after 2 weeks.  · Your swelling increases or spreads to other areas.  · Your lymph nodes are hard, seem fixed to the skin, or are growing rapidly.  · Your skin over the lymph nodes is red and inflamed.  · You have a fever.  · You have chills.  · You have  fatigue.  · You develop a sore throat.  · You have abdominal pain.  · You have weight loss.  · You have night sweats.  Get help right away if:  · You notice fluid leaking from the area of the enlarged lymph node.  · You have severe pain in any area of your body.  · You have chest pain.  · You have shortness of breath.  This information is not intended to replace advice given to you by your health care provider. Make sure you discuss any questions you have with your health care provider.  Document Released: 09/26/2009 Document Revised: 05/25/2017 Document Reviewed: 07/23/2015  [employer] Patient Education © 2018 [employer]  Follow up with one of the [facility] physician groups below to setup primary care. If you have trouble following up, please call the [facility] Nurse Line at [phone]    (Dr. Napoleon Mackenzie DO,  RAZIA Moscoso, and RAZIA Rodríguez)  [facility], Primary Care   [address] [address], [address], [place] [address] [phone]     (Dr. Aide Bergman MD, RAZIA Main, and RAZIA Sutherland)  [facility], Primary Care   [address] [place] [address] [phone]    (Dr. Tucker Angulo MD and Dr. Noe Mckeon MD)  [facility], Primary Care  [address], [address]  [phone]    (Dr. Marc Devlin MD)  [facility], Primary Care  [address], [address], [place], [place], [address] [phone]

## 2018-10-13 LAB — BACTERIA SPEC AEROBE CULT: NORMAL

## 2019-12-12 ENCOUNTER — NURSE TRIAGE (OUTPATIENT)
Dept: CALL CENTER | Facility: HOSPITAL | Age: 29
End: 2019-12-12

## 2019-12-12 ENCOUNTER — HOSPITAL ENCOUNTER (EMERGENCY)
Facility: HOSPITAL | Age: 29
Discharge: HOME | End: 2019-12-12
Admitting: EMERGENCY MEDICINE

## 2019-12-12 VITALS
WEIGHT: 186 LBS | RESPIRATION RATE: 14 BRPM | DIASTOLIC BLOOD PRESSURE: 77 MMHG | TEMPERATURE: 98.4 F | OXYGEN SATURATION: 100 % | SYSTOLIC BLOOD PRESSURE: 120 MMHG | HEIGHT: 65 IN | HEART RATE: 95 BPM | BODY MASS INDEX: 30.99 KG/M2

## 2019-12-12 DIAGNOSIS — N76.2 CELLULITIS OF LABIA: ICD-10-CM

## 2019-12-12 DIAGNOSIS — N76.0 BV (BACTERIAL VAGINOSIS): ICD-10-CM

## 2019-12-12 DIAGNOSIS — B96.89 BV (BACTERIAL VAGINOSIS): ICD-10-CM

## 2019-12-12 DIAGNOSIS — K12.2 UVULITIS: Primary | ICD-10-CM

## 2019-12-12 LAB
CLUE CELLS SPEC QL WET PREP: ABNORMAL
HYDATID CYST SPEC WET PREP: ABNORMAL
S PYO AG THROAT QL: NEGATIVE
T VAGINALIS SPEC QL WET PREP: ABNORMAL
WBC SPEC QL WET PREP: ABNORMAL
YEAST GENITAL QL WET PREP: ABNORMAL

## 2019-12-12 PROCEDURE — 87491 CHLMYD TRACH DNA AMP PROBE: CPT | Performed by: NURSE PRACTITIONER

## 2019-12-12 PROCEDURE — 87661 TRICHOMONAS VAGINALIS AMPLIF: CPT | Performed by: NURSE PRACTITIONER

## 2019-12-12 PROCEDURE — 87210 SMEAR WET MOUNT SALINE/INK: CPT | Performed by: NURSE PRACTITIONER

## 2019-12-12 PROCEDURE — 87591 N.GONORRHOEAE DNA AMP PROB: CPT | Performed by: NURSE PRACTITIONER

## 2019-12-12 PROCEDURE — 87081 CULTURE SCREEN ONLY: CPT | Performed by: NURSE PRACTITIONER

## 2019-12-12 PROCEDURE — 99283 EMERGENCY DEPT VISIT LOW MDM: CPT

## 2019-12-12 PROCEDURE — 87880 STREP A ASSAY W/OPTIC: CPT | Performed by: NURSE PRACTITIONER

## 2019-12-12 RX ORDER — CLINDAMYCIN HYDROCHLORIDE 300 MG/1
300 CAPSULE ORAL EVERY 6 HOURS
Qty: 40 CAPSULE | Refills: 0 | Status: SHIPPED | OUTPATIENT
Start: 2019-12-12 | End: 2019-12-22

## 2019-12-12 RX ORDER — ONDANSETRON 4 MG/1
4 TABLET, ORALLY DISINTEGRATING ORAL EVERY 6 HOURS PRN
Qty: 12 TABLET | Refills: 0 | Status: SHIPPED | OUTPATIENT
Start: 2019-12-12 | End: 2019-12-15

## 2019-12-12 NOTE — ED PROVIDER NOTES
Subjective   29 yof c/o swollen uvula and swollen left labia.  She states she noticed a cyst on her labia yesterday.  She states she soaked in a hot tub and the area started to drain.  Today she woke up with a sore throat and swollen uvula.  She believes they might be connected.          Review of Systems   Constitutional: Negative for activity change, appetite change, fatigue and fever.   HENT: Positive for sore throat. Negative for congestion, ear pain and facial swelling.    Eyes: Negative for discharge and visual disturbance.   Respiratory: Negative for apnea, chest tightness, shortness of breath, wheezing and stridor.    Cardiovascular: Negative for chest pain and palpitations.   Gastrointestinal: Negative for abdominal distention, abdominal pain, diarrhea, nausea and vomiting.   Genitourinary: Negative for difficulty urinating, dysuria, pelvic pain, vaginal bleeding, vaginal discharge and vaginal pain.   Musculoskeletal: Negative for arthralgias and myalgias.   Skin: Negative for rash and wound.   Neurological: Negative for dizziness and seizures.   Psychiatric/Behavioral: Negative for agitation and confusion.       Past Medical History:   Diagnosis Date   • Anxiety    • Bipolar 1 disorder (CMS/HCC)    • Depression        No Known Allergies    Past Surgical History:   Procedure Laterality Date   • TUBAL ABDOMINAL LIGATION         No family history on file.    Social History     Socioeconomic History   • Marital status: Single     Spouse name: Not on file   • Number of children: Not on file   • Years of education: Not on file   • Highest education level: Not on file   Tobacco Use   • Smoking status: Current Every Day Smoker     Packs/day: 0.50     Types: Cigarettes   Substance and Sexual Activity   • Alcohol use: No   • Drug use: No   • Sexual activity: Yes     Partners: Male           Objective   Physical Exam   Constitutional: She is oriented to person, place, and time. She appears well-developed.   HENT:    Head: Normocephalic.   Mouth/Throat: Uvula is midline. No oral lesions. Uvula swelling present. Posterior oropharyngeal erythema present. No oropharyngeal exudate, posterior oropharyngeal edema or tonsillar abscesses.   Eyes: Pupils are equal, round, and reactive to light. EOM are normal.   Neck: Normal range of motion. Neck supple.   Cardiovascular: Normal rate and regular rhythm.   No murmur heard.  Pulmonary/Chest: Effort normal and breath sounds normal.   Abdominal: Soft. Bowel sounds are normal.   Genitourinary:         Genitourinary Comments: Small raised area present to the left labia.  No visible drainage.  No fluctuance present. No vaginal discharge observed.   Musculoskeletal: Normal range of motion.   Neurological: She is alert and oriented to person, place, and time.   Skin: Skin is warm and dry.   Psychiatric: She has a normal mood and affect.   Nursing note and vitals reviewed.      Procedures          No current facility-administered medications for this encounter.     Current Outpatient Medications:   •  clindamycin (CLEOCIN) 300 MG capsule, Take 1 capsule by mouth Every 6 (Six) Hours for 10 days., Disp: 40 capsule, Rfl: 0  •  diazePAM (VALIUM) 5 MG tablet, Take 5 mg by mouth Every 12 (Twelve) Hours As Needed for Anxiety., Disp: , Rfl:   •  HYDROcodone-acetaminophen (NORCO) 5-325 MG per tablet, Take 1 tablet by mouth Every 4 (Four) Hours As Needed for Moderate Pain ., Disp: 12 tablet, Rfl: 0  •  ibuprofen (ADVIL,MOTRIN) 800 MG tablet, Take 1 tablet by mouth Every 6 (Six) Hours As Needed for Mild Pain (1-3)., Disp: 21 tablet, Rfl: 0  •  lamoTRIgine (LaMICtal) 100 MG tablet, Take 100 mg by mouth Daily., Disp: , Rfl:   •  lurasidone (LATUDA) 40 MG tablet tablet, Take 40 mg by mouth Daily., Disp: , Rfl:   •  ondansetron ODT (ZOFRAN-ODT) 4 MG disintegrating tablet, Take 1 tablet by mouth Every 6 (Six) Hours As Needed for Nausea for up to 3 days., Disp: 12 tablet, Rfl: 0    Vital signs:  /77 (BP  "Location: Right arm, Patient Position: Sitting)   Pulse 95   Temp 98.4 °F (36.9 °C) (Oral)   Resp 14   Ht 165.1 cm (65\")   Wt 84.4 kg (186 lb)   LMP 11/12/2019   SpO2 100%   BMI 30.95 kg/m²        ED LAB RESULTS:   Lab Results (last 24 hours)     Procedure Component Value Units Date/Time    Rapid Strep A Screen - Swab, Throat [[id number]]  (Normal) Collected:  12/12/19 0946    Specimen:  Swab from Throat Updated:  12/12/19 1012     Strep A Ag Negative    Beta Strep Culture, Throat - Swab, Throat [[id number]] Collected:  12/12/19 0946    Specimen:  Swab from Throat Updated:  12/12/19 1012    Wet Prep, Genital - Swab, Vagina [[id number]]  (Abnormal) Collected:  12/12/19 0958    Specimen:  Swab from Vagina Updated:  12/12/19 1011     YEAST No yeast seen     HYPHAL ELEMENTS No Hyphal elements seen     WBC'S 3+ WBC's seen     Clue Cells, Wet Prep 3+ Clue cells seen     Trichomonas, Wet Prep No Trichomonas seen    Trichomonas vaginalis, PCR - Swab, Cervix [[id number]] Collected:  12/12/19 0958    Specimen:  Swab from Cervix Updated:  12/12/19 1158    Chlamydia trachomatis, Neisseria gonorrhoeae, PCR - Swab, Cervix [[id number]] Collected:  12/12/19 0958    Specimen:  Swab from Cervix Updated:  12/12/19 1201             IMAGING RESULTS  No orders to display                    ED Course  ED Course as of Dec 12 1526   Thu Dec 12, 2019   1100 I discussed the patient's testing results with her. She voiced understanding of instructions.    [KS]      ED Course User Index  [KS] Rebeca Franco, RAZIA                      No data recorded                        MDM  Number of Diagnoses or Management Options  BV (bacterial vaginosis): minor  Cellulitis of labia: minor  Uvulitis: minor     Amount and/or Complexity of Data Reviewed  Clinical lab tests: ordered and reviewed    Risk of Complications, Morbidity, and/or Mortality  Presenting problems: minimal  Diagnostic procedures: minimal  Management options: minimal    Patient " Progress  Patient progress: stable      Final diagnoses:   Uvulitis   BV (bacterial vaginosis)   Cellulitis of labia              Shoulders, Rebeca Lombardo, APRN  12/12/19 1716

## 2019-12-12 NOTE — TELEPHONE ENCOUNTER
Caller has a swollen uvula and vulvar cyst.  Will be going to ED or calling her provider    Reason for Disposition  • All other adults with swollen tongue   (Exception: tongue swelling is a recurrent problem AND NO swelling at present)    Additional Information  • Negative: Severe difficulty breathing (e.g., struggling for each breath, speaks in single words, stridor)  • Negative: Sounds like a life-threatening emergency to the triager  • Negative: Injury to tooth or teeth  • Negative: Injury to mouth  • Negative: Canker sore suspected (i.e., aphthous ulcer) in the mouth  • Negative: Cold sore suspected (i.e., fever blister sore) on the outer lip  • Negative: Tooth is painful or swelling around a tooth  • Negative: Mouth is painful  • Negative: Throat is painful  • Negative: Lip swelling  • Negative: [1] Drooling or spitting out saliva (because can't swallow) AND [2] new onset  • Negative: [1] Bleeding from mouth AND [2] won't stop after 10 minutes of direct pressure  • Negative: Electrical burn of mouth  • Negative: Chemical burn of mouth  • Negative: [1] Difficulty breathing AND [2] not severe  • Negative: Patient sounds very sick or weak to the triager  • Negative: Receiving chemotherapy or radiation therapy  • Negative: White patches that stick to tongue or inner cheek  • Negative: [1] Dry mouth AND [2] new onset AND [3] unexplained(Exceptions: recurrent ongoing problem or dry mouth from mild dehydration)  • Negative: Weak immune system (e.g., HIV positive, cancer chemo, splenectomy, organ transplant, chronic steroids)  • Negative: Dentures rub and cause pain  • Negative: Painless lump in mouth  • Negative: Red patch in mouth  • Negative: White patch in mouth  • Negative: Dry mouth is a chronic symptom (recurrent or ongoing AND present > 4 weeks)  • Negative: Bleeding from mouth is a chronic symptom (recurrent or ongoing AND present > 4 weeks)  • Negative: Cracked skin at corners of mouth (i.e. where lips come  "together)  • Negative: [1] Chapped lips AND [2] no improvement using CARE ADVICE  • Negative: All other mouth symptoms (Exceptions: dry mouth from not drinking enough liquids, chapped lips)  • Tongue swelling  • Negative: Started suddenly after sting from bee, wasp, or yellow jacket  • Negative: Started suddenly after taking a medicine or allergic food (e.g., nuts)  • Negative: Wheezing, stridor, hoarseness, or difficulty breathing  • Negative: Facial swelling  • Negative: Neck swelling  • Negative: Can't swallow normal secretions (e.g., drooling or spitting)  • Negative: Taking an ACE Inhibitor medication  (e.g., benazepril/LOTENSIN, captopril/CAPOTEN, enalapril/VASOTEC, lisinopril/ZESTRIL)  • Negative: Sounds like a life-threatening emergency to the triager  • Negative: Followed a tongue injury  • Negative: Pain in tongue, mouth, or tooth    Answer Assessment - Initial Assessment Questions  1. SYMPTOM: \"What's the main symptom you're concerned about?\" (e.g., dry mouth. chapped lips, lump)      Patient states her uvula is swollen  2. ONSET: \"When did the  ________  start?\"      Notice today  3. PAIN: \"Is there any pain?\" If so, ask: \"How bad is it?\" (Scale: 1-10; mild, moderate, severe)      none  4. CAUSE: \"What do you think is causing the symptoms?\"      unsure  5. OTHER SYMPTOMS: \"Do you have any other symptoms?\" (e.g., fever, sore throat, toothache, swelling)      no  6. PREGNANCY: \"Is there any chance you are pregnant?\" \"When was your last menstrual period?\"      na    Answer Assessment - Initial Assessment Questions  1. ONSET: \"When did the swelling start?\" (e.g., minutes, hours, days)      today  2. LOCATION: \"What part of the tongue is swollen?\"      Throat swelling uvula  3. SEVERITY: \"How swollen is it?\"      unsure  4. CAUSE: \"What do you think is causing the tongue swelling?\" (e.g., hx of angioedema, allergies)      na  5. RECURRENT SYMPTOM: \"Have you had tongue swelling before?\" If so, ask: \"When was " "the last time?\" \"What happened that time?\"      na  6. OTHER SYMPTOMS: \"Do you have any other symptoms?\" (e.g., difficulty breathing, facial swelling)      Swollen uvula  7. PREGNANCY: \"Is there any chance you are pregnant?\" \"When was your last menstrual period?\"      na    Protocols used: TONGUE SWELLING-ADULT-AH, MOUTH SYMPTOMS-ADULT-AH      "

## 2019-12-12 NOTE — DISCHARGE INSTRUCTIONS
Increase fluids.  Tylenol or motrin as needed for pain/fever. Medication as ordered.  Continue warm tub soaks.  Follow up with Pcp tomorrow - call for appointment. Return to ED if condition does not improve or worsens

## 2019-12-13 LAB
C TRACH RRNA CVX QL NAA+PROBE: NOT DETECTED
N GONORRHOEA RRNA SPEC QL NAA+PROBE: NOT DETECTED
TRICHOMONAS VAGINALIS PCR: NOT DETECTED

## 2019-12-14 LAB — BACTERIA SPEC AEROBE CULT: NORMAL

## 2019-12-16 NOTE — ED NOTES
"ED Call Back Questions    1. How are you doing since leaving the Emergency Department?  Feeling pretty good, the antibiotics are helping    2. Do you have any questions about your discharge instructions? No     3. Have you filled your new prescriptions yet? Yes   a. Do you have any questions about those medications? No     4. Were you able to make a follow-up appointment with the physician? Yes     5. Do you have a primary care physician? Yes   a. If No, would you like for me to set you up with one? No   i. If Yes, “I will have our ED  give you a call right back at this number to work with you on the best time for an appointment.”    6. We are always looking to get better at what we do. Do you have any suggestions for what we can do to be even better? N/A  a. If Yes, \"Thank you for sharing your concerns. I apologize. I will follow up with our manager and patient . Would you like someone to call you back?\" N/A    7. Is there anything else I can do for you? N/A visit was good       Maximino Arroyo  12/16/19 3635    "

## 2021-04-28 ENCOUNTER — HOSPITAL ENCOUNTER (EMERGENCY)
Facility: HOSPITAL | Age: 31
Discharge: HOME | End: 2021-04-28
Admitting: EMERGENCY MEDICINE

## 2021-04-28 ENCOUNTER — APPOINTMENT (OUTPATIENT)
Dept: CT IMAGING | Facility: HOSPITAL | Age: 31
End: 2021-04-28

## 2021-04-28 VITALS
SYSTOLIC BLOOD PRESSURE: 121 MMHG | BODY MASS INDEX: 31.65 KG/M2 | WEIGHT: 190 LBS | OXYGEN SATURATION: 100 % | TEMPERATURE: 98 F | DIASTOLIC BLOOD PRESSURE: 81 MMHG | RESPIRATION RATE: 16 BRPM | HEIGHT: 65 IN | HEART RATE: 76 BPM

## 2021-04-28 DIAGNOSIS — S06.0X0A CONCUSSION WITHOUT LOSS OF CONSCIOUSNESS, INITIAL ENCOUNTER: ICD-10-CM

## 2021-04-28 DIAGNOSIS — S09.90XA INJURY OF HEAD, INITIAL ENCOUNTER: Primary | ICD-10-CM

## 2021-04-28 PROCEDURE — 99283 EMERGENCY DEPT VISIT LOW MDM: CPT

## 2021-04-28 PROCEDURE — 72125 CT NECK SPINE W/O DYE: CPT

## 2021-04-28 PROCEDURE — 70450 CT HEAD/BRAIN W/O DYE: CPT

## 2021-04-28 RX ORDER — RISPERIDONE 0.5 MG/1
0.5 TABLET ORAL DAILY
COMMUNITY

## 2022-12-28 ENCOUNTER — HOSPITAL ENCOUNTER (EMERGENCY)
Facility: HOSPITAL | Age: 32
Discharge: HOME | End: 2022-12-28
Admitting: EMERGENCY MEDICINE
Payer: MEDICAID

## 2022-12-28 VITALS
SYSTOLIC BLOOD PRESSURE: 126 MMHG | TEMPERATURE: 97.8 F | HEART RATE: 80 BPM | RESPIRATION RATE: 15 BRPM | BODY MASS INDEX: 27.49 KG/M2 | OXYGEN SATURATION: 100 % | HEIGHT: 65 IN | WEIGHT: 165 LBS | DIASTOLIC BLOOD PRESSURE: 83 MMHG
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
## 2022-12-28 PROCEDURE — 99282 EMERGENCY DEPT VISIT SF MDM: CPT

## 2022-12-28 RX ORDER — IBUPROFEN 800 MG/1
800 TABLET ORAL EVERY 8 HOURS PRN
Qty: 20 TABLET | Refills: 0 | Status: SHIPPED | OUTPATIENT
Start: 2022-12-28 | End: 2023-01-07

## 2022-12-28 NOTE — DISCHARGE INSTRUCTIONS
Increase fluids.  Medication as ordered.   Can add tylenol as needed for pain/fever.  Can apply ice to sore areas.  Follow up with PCP - call tomorrow for appointment. Return to ED if condition does not improve or worsens
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
## 2025-03-18 ENCOUNTER — HOSPITAL ENCOUNTER (EMERGENCY)
Facility: HOSPITAL | Age: 35
Discharge: PSYCHIATRIC OR SUBSTANCE USE FACILITY | End: 2025-03-19
Attending: STUDENT IN AN ORGANIZED HEALTH CARE EDUCATION/TRAINING PROGRAM | Admitting: STUDENT IN AN ORGANIZED HEALTH CARE EDUCATION/TRAINING PROGRAM
Payer: COMMERCIAL

## 2025-03-18 DIAGNOSIS — R45.851 SUICIDAL IDEATION: Primary | ICD-10-CM

## 2025-03-18 PROCEDURE — 99285 EMERGENCY DEPT VISIT HI MDM: CPT

## 2025-03-18 RX ORDER — LISDEXAMFETAMINE DIMESYLATE 10 MG/1
10 CAPSULE ORAL AS NEEDED
COMMUNITY

## 2025-03-18 RX ORDER — LISDEXAMFETAMINE DIMESYLATE 40 MG/1
40 CAPSULE ORAL EVERY MORNING
COMMUNITY

## 2025-03-19 VITALS
BODY MASS INDEX: 27.91 KG/M2 | HEART RATE: 64 BPM | WEIGHT: 167.5 LBS | TEMPERATURE: 98.5 F | OXYGEN SATURATION: 100 % | DIASTOLIC BLOOD PRESSURE: 69 MMHG | RESPIRATION RATE: 14 BRPM | HEIGHT: 65 IN | SYSTOLIC BLOOD PRESSURE: 122 MMHG

## 2025-03-19 LAB
ALBUMIN SERPL-MCNC: 4.1 G/DL (ref 3.5–5.2)
ALBUMIN/GLOB SERPL: 1.2 G/DL
ALP SERPL-CCNC: 106 U/L (ref 39–117)
ALT SERPL W P-5'-P-CCNC: 22 U/L (ref 1–33)
AMPHET+METHAMPHET UR QL: NEGATIVE
AMPHETAMINES UR QL: POSITIVE
ANION GAP SERPL CALCULATED.3IONS-SCNC: 13 MMOL/L (ref 5–15)
APAP SERPL-MCNC: <5 MCG/ML (ref 0–30)
AST SERPL-CCNC: 19 U/L (ref 1–32)
BARBITURATES UR QL SCN: NEGATIVE
BASOPHILS # BLD AUTO: 0.07 10*3/MM3 (ref 0–0.2)
BASOPHILS NFR BLD AUTO: 0.8 % (ref 0–1.5)
BENZODIAZ UR QL SCN: NEGATIVE
BILIRUB SERPL-MCNC: 0.2 MG/DL (ref 0–1.2)
BUN SERPL-MCNC: 13 MG/DL (ref 6–20)
BUN/CREAT SERPL: 16.9 (ref 7–25)
BUPRENORPHINE SERPL-MCNC: NEGATIVE NG/ML
CALCIUM SPEC-SCNC: 8.9 MG/DL (ref 8.6–10.5)
CANNABINOIDS SERPL QL: POSITIVE
CHLORIDE SERPL-SCNC: 102 MMOL/L (ref 98–107)
CO2 SERPL-SCNC: 24 MMOL/L (ref 22–29)
COCAINE UR QL: NEGATIVE
CREAT SERPL-MCNC: 0.77 MG/DL (ref 0.57–1)
DEPRECATED RDW RBC AUTO: 43.7 FL (ref 37–54)
EGFRCR SERPLBLD CKD-EPI 2021: 103.3 ML/MIN/1.73
EOSINOPHIL # BLD AUTO: 0.23 10*3/MM3 (ref 0–0.4)
EOSINOPHIL NFR BLD AUTO: 2.6 % (ref 0.3–6.2)
ERYTHROCYTE [DISTWIDTH] IN BLOOD BY AUTOMATED COUNT: 12.9 % (ref 12.3–15.4)
ETHANOL UR QL: <0.01 %
FENTANYL UR-MCNC: NEGATIVE NG/ML
GLOBULIN UR ELPH-MCNC: 3.3 GM/DL
GLUCOSE SERPL-MCNC: 110 MG/DL (ref 65–99)
HCG SERPL QL: NEGATIVE
HCT VFR BLD AUTO: 41.5 % (ref 34–46.6)
HGB BLD-MCNC: 13.7 G/DL (ref 12–15.9)
HOLD SPECIMEN: NORMAL
IMM GRANULOCYTES # BLD AUTO: 0.02 10*3/MM3 (ref 0–0.05)
IMM GRANULOCYTES NFR BLD AUTO: 0.2 % (ref 0–0.5)
LYMPHOCYTES # BLD AUTO: 3.15 10*3/MM3 (ref 0.7–3.1)
LYMPHOCYTES NFR BLD AUTO: 36 % (ref 19.6–45.3)
MCH RBC QN AUTO: 30.2 PG (ref 26.6–33)
MCHC RBC AUTO-ENTMCNC: 33 G/DL (ref 31.5–35.7)
MCV RBC AUTO: 91.4 FL (ref 79–97)
METHADONE UR QL SCN: NEGATIVE
MONOCYTES # BLD AUTO: 0.72 10*3/MM3 (ref 0.1–0.9)
MONOCYTES NFR BLD AUTO: 8.2 % (ref 5–12)
NEUTROPHILS NFR BLD AUTO: 4.55 10*3/MM3 (ref 1.7–7)
NEUTROPHILS NFR BLD AUTO: 52.2 % (ref 42.7–76)
NRBC BLD AUTO-RTO: 0 /100 WBC (ref 0–0.2)
OPIATES UR QL: NEGATIVE
OXYCODONE UR QL SCN: NEGATIVE
PCP UR QL SCN: NEGATIVE
PLATELET # BLD AUTO: 398 10*3/MM3 (ref 140–450)
PMV BLD AUTO: 9.5 FL (ref 6–12)
POTASSIUM SERPL-SCNC: 3.9 MMOL/L (ref 3.5–5.2)
PROT SERPL-MCNC: 7.4 G/DL (ref 6–8.5)
RBC # BLD AUTO: 4.54 10*6/MM3 (ref 3.77–5.28)
SALICYLATES SERPL-MCNC: <0.3 MG/DL
SODIUM SERPL-SCNC: 139 MMOL/L (ref 136–145)
TRICYCLICS UR QL SCN: NEGATIVE
TSH SERPL DL<=0.05 MIU/L-ACNC: 2.71 UIU/ML (ref 0.27–4.2)
WBC NRBC COR # BLD AUTO: 8.74 10*3/MM3 (ref 3.4–10.8)
WHOLE BLOOD HOLD COAG: NORMAL
WHOLE BLOOD HOLD SPECIMEN: NORMAL

## 2025-03-19 PROCEDURE — 84443 ASSAY THYROID STIM HORMONE: CPT | Performed by: STUDENT IN AN ORGANIZED HEALTH CARE EDUCATION/TRAINING PROGRAM

## 2025-03-19 PROCEDURE — 80143 DRUG ASSAY ACETAMINOPHEN: CPT | Performed by: STUDENT IN AN ORGANIZED HEALTH CARE EDUCATION/TRAINING PROGRAM

## 2025-03-19 PROCEDURE — 82077 ASSAY SPEC XCP UR&BREATH IA: CPT | Performed by: STUDENT IN AN ORGANIZED HEALTH CARE EDUCATION/TRAINING PROGRAM

## 2025-03-19 PROCEDURE — 85025 COMPLETE CBC W/AUTO DIFF WBC: CPT | Performed by: STUDENT IN AN ORGANIZED HEALTH CARE EDUCATION/TRAINING PROGRAM

## 2025-03-19 PROCEDURE — 80053 COMPREHEN METABOLIC PANEL: CPT | Performed by: STUDENT IN AN ORGANIZED HEALTH CARE EDUCATION/TRAINING PROGRAM

## 2025-03-19 PROCEDURE — 84703 CHORIONIC GONADOTROPIN ASSAY: CPT | Performed by: STUDENT IN AN ORGANIZED HEALTH CARE EDUCATION/TRAINING PROGRAM

## 2025-03-19 PROCEDURE — 80179 DRUG ASSAY SALICYLATE: CPT | Performed by: STUDENT IN AN ORGANIZED HEALTH CARE EDUCATION/TRAINING PROGRAM

## 2025-03-19 PROCEDURE — 36415 COLL VENOUS BLD VENIPUNCTURE: CPT

## 2025-03-19 PROCEDURE — 80307 DRUG TEST PRSMV CHEM ANLYZR: CPT | Performed by: STUDENT IN AN ORGANIZED HEALTH CARE EDUCATION/TRAINING PROGRAM

## 2025-03-19 RX ORDER — SODIUM CHLORIDE 0.9 % (FLUSH) 0.9 %
10 SYRINGE (ML) INJECTION AS NEEDED
Status: DISCONTINUED | OUTPATIENT
Start: 2025-03-19 | End: 2025-03-19 | Stop reason: HOSPADM

## 2025-03-19 NOTE — ED NOTES
The following interventions were initiated for the safety of this patient:    [x] Patient and/or family given education   [x] Room cleared of all removable items not necessary for patient care.   [x] All ligature risk items removed from room.   [x] 1:1  within arms reach at bedside at all times, including when in bathroom.   [x] Patient clothing and belongings given to security.   [x] Patient provided with paper scrub shirt and pants and nonskid socks.   [x] Linen limited to one pillow, one fitted sheet, and one blanket.    [x] Red light house sign placed on patient door for High Risk [place]-SUICIDE SEVERITY RATING SCALE    [] [place] light house sign placed on patient door for Moderate Risk [place]-SUICIDE SEVERITY RATING SCALE    [] Yellow light house sign placed on patient door for Low Risk [place]-SUICIDE SEVERITY RATING SCALE   [x] ED provider notified of suicide risk screening.

## 2025-03-19 NOTE — ED NOTES
Renata with [facility] patient will go to the Crisis Unit. She is working on getting her note together and will send it over when finished. Provider and primary nurse notified.

## 2025-03-19 NOTE — ED PROVIDER NOTES
Subjective   History of Present Illness  The patient is a 34-year-old female with a history of bipolar disorder and ADHD who presents to the ED with suicidal ideation. She reports having specific plans to jump off a marilee at [employer], having calculated the 17-hour walk distance from her home. She also endorses impulsive thoughts of hurting others, though without specific plans or targets. The patient reports being previously stable on lamotrigine 400mg daily for 8 years and Vyvanse, but states the medications have become less effective over the past year. Two weeks ago, she discontinued her medications due to perceived ineffectiveness. She took one 10mg Vyvanse recently in an attempt to organize her thoughts. She denies fever, vomiting, or diarrhea. The patient expresses concern that hormonal changes related to approaching age 35 may be affecting her medication efficacy.        Review of Systems   All other systems reviewed and are negative.      Past Medical History:   Diagnosis Date    Anxiety     Bipolar 1 disorder     Depression        Allergies   Allergen Reactions    Serotonin Reuptake Inhibitors (Ssris) Mental Status Change       Past Surgical History:   Procedure Laterality Date    TUBAL ABDOMINAL LIGATION         History reviewed. No pertinent family history.    Social History     Socioeconomic History    Marital status:    Tobacco Use    Smoking status: Every Day     Current packs/day: 0.50     Types: Cigarettes   Substance and Sexual Activity    Alcohol use: No    Drug use: No    Sexual activity: Yes     Partners: Male           Objective   Physical Exam      Constitutional:  General: Patient is not in acute distress.  Appearance: Patient is not diaphoretic.    HENT:  Head: Normocephalic and atraumatic.  Right Ear: External ear normal.  Left Ear: External ear normal.  Nose: Nose normal.    Eyes:  General: No scleral icterus.  Conjunctiva/sclera: Conjunctivae normal.    Cardiovascular:  Rate  and Rhythm: Normal rate and regular rhythm.  Heart sounds: No friction rub.    Pulmonary:  Effort: Pulmonary effort is normal. No respiratory distress.  Breath sounds: No stridor. No wheezing.    Musculoskeletal:  General: No deformity.    Skin:  General: Skin is warm and dry. No rashes present. Normal color. Normal turgor.    Neurological:  General: No focal deficit present.  Mental Status: Alert and oriented. **Expressing suicidal ideation with specific plan**      Procedures           ED Course  ED Course as of 03/19/25 2240   Wed Mar 19, 2025   0047 Medical cleared [SG]   0102 Waiting on mental health services evaluation and recommendation.  [SG]   0158 THC and methamphetamine positive, however no active toxidrome on physical exam.  [SG]   0306 Ms Sharif from [facility] recommend behavioral transfer to crisis unit at [facility]. PT in agreement.  [SG]      ED Course User Index  [SG] Noman Holley MD                                                       Medical Decision Making  Patient presents with acute suicidal ideation in the setting of medication non-compliance for bipolar disorder.    Laboratory studies: Urine sample obtained for toxicology screening.    The patient requires psychiatric evaluation for safety assessment and medication management. Given the severity of her suicidal ideation with specific plan, recent medication discontinuation, and active bipolar disorder, inpatient psychiatric admission is warranted for stabilization.    Problems Addressed:  Suicidal ideation: complicated acute illness or injury    Amount and/or Complexity of Data Reviewed  Labs: ordered.    Risk  Prescription drug management.        Final diagnoses:   Suicidal ideation       ED Disposition  ED Disposition       ED Disposition   DC/Transfer to Behavioral Health    Condition   Stable    Comment   --               No follow-up provider specified.       Medication List      No changes were made to your prescriptions during  this visit.            Noman Holley MD  03/19/25 2385